# Patient Record
Sex: MALE | Race: WHITE | NOT HISPANIC OR LATINO | Employment: FULL TIME | URBAN - METROPOLITAN AREA
[De-identification: names, ages, dates, MRNs, and addresses within clinical notes are randomized per-mention and may not be internally consistent; named-entity substitution may affect disease eponyms.]

---

## 2023-06-21 ENCOUNTER — OFFICE VISIT (OUTPATIENT)
Dept: URGENT CARE | Facility: CLINIC | Age: 38
End: 2023-06-21
Payer: COMMERCIAL

## 2023-06-21 VITALS
SYSTOLIC BLOOD PRESSURE: 120 MMHG | HEIGHT: 73 IN | TEMPERATURE: 98 F | WEIGHT: 220 LBS | HEART RATE: 92 BPM | RESPIRATION RATE: 18 BRPM | DIASTOLIC BLOOD PRESSURE: 75 MMHG | OXYGEN SATURATION: 97 % | BODY MASS INDEX: 29.16 KG/M2

## 2023-06-21 DIAGNOSIS — J02.9 SORE THROAT: Primary | ICD-10-CM

## 2023-06-21 DIAGNOSIS — J02.9 PHARYNGITIS, UNSPECIFIED ETIOLOGY: ICD-10-CM

## 2023-06-21 LAB
CTP QC/QA: YES
MOLECULAR STREP A: NEGATIVE

## 2023-06-21 PROCEDURE — 99213 PR OFFICE/OUTPT VISIT, EST, LEVL III, 20-29 MIN: ICD-10-PCS | Mod: 25,,, | Performed by: NURSE PRACTITIONER

## 2023-06-21 PROCEDURE — 99213 OFFICE O/P EST LOW 20 MIN: CPT | Mod: 25,,, | Performed by: NURSE PRACTITIONER

## 2023-06-21 PROCEDURE — 96372 THER/PROPH/DIAG INJ SC/IM: CPT | Mod: ,,, | Performed by: NURSE PRACTITIONER

## 2023-06-21 PROCEDURE — 96372 PR INJECTION,THERAP/PROPH/DIAG2ST, IM OR SUBCUT: ICD-10-PCS | Mod: ,,, | Performed by: NURSE PRACTITIONER

## 2023-06-21 PROCEDURE — 87651 POCT STREP A MOLECULAR: ICD-10-PCS | Mod: QW,,, | Performed by: NURSE PRACTITIONER

## 2023-06-21 PROCEDURE — 87651 STREP A DNA AMP PROBE: CPT | Mod: QW,,, | Performed by: NURSE PRACTITIONER

## 2023-06-21 RX ORDER — DEXAMETHASONE SODIUM PHOSPHATE 100 MG/10ML
10 INJECTION INTRAMUSCULAR; INTRAVENOUS ONCE
Status: COMPLETED | OUTPATIENT
Start: 2023-06-21 | End: 2023-06-21

## 2023-06-21 RX ADMIN — DEXAMETHASONE SODIUM PHOSPHATE 10 MG: 100 INJECTION INTRAMUSCULAR; INTRAVENOUS at 09:06

## 2023-06-21 NOTE — PROGRESS NOTES
"Subjective:      Patient ID: Andrade Avalos is a 38 y.o. male.    Vitals:  height is 6' 1" (1.854 m) and weight is 99.8 kg (220 lb). His oral temperature is 98.2 °F (36.8 °C). His blood pressure is 120/75 and his pulse is 92. His respiration is 18 and oxygen saturation is 97%.     Chief Complaint: Sore Throat (Sore throat and left ear pain x one week. Requesting pcp referral.)    38-year-old male presents with sore throat and left ear discomfort.  Onset 2 days ago.  No shortness of breath or fever.    Sore Throat   Associated symptoms include ear pain (left).     HENT:  Positive for ear pain (left) and sore throat.     Objective:     Physical Exam   Constitutional: He is oriented to person, place, and time. He appears well-developed. He is cooperative.  Non-toxic appearance. He does not appear ill. No distress.   HENT:   Head: Normocephalic and atraumatic.   Ears:   Right Ear: Hearing, tympanic membrane, external ear and ear canal normal.   Left Ear: Hearing, tympanic membrane, external ear and ear canal normal.   Nose: Nose normal. No mucosal edema, rhinorrhea or nasal deformity. No epistaxis. Right sinus exhibits no maxillary sinus tenderness and no frontal sinus tenderness. Left sinus exhibits no maxillary sinus tenderness and no frontal sinus tenderness.   Mouth/Throat: Uvula is midline and mucous membranes are normal. No trismus in the jaw. Normal dentition. No uvula swelling. Oropharyngeal exudate and posterior oropharyngeal erythema present. No posterior oropharyngeal edema.   Eyes: Conjunctivae and lids are normal. No scleral icterus.   Neck: Trachea normal and phonation normal. Neck supple. No edema present. No erythema present. No neck rigidity present.   Cardiovascular: Normal rate, regular rhythm, normal heart sounds and normal pulses.   Pulmonary/Chest: Effort normal and breath sounds normal. No respiratory distress. He has no decreased breath sounds. He has no rhonchi.   Abdominal: Normal appearance. "   Musculoskeletal: Normal range of motion.         General: No deformity. Normal range of motion.   Neurological: He is alert and oriented to person, place, and time. He exhibits normal muscle tone. Coordination normal.   Skin: Skin is warm, dry, intact, not diaphoretic and not pale.   Psychiatric: His speech is normal and behavior is normal. Judgment and thought content normal.   Nursing note and vitals reviewed.    Assessment:     1. Sore throat    2. Pharyngitis, unspecified etiology      Office Visit on 06/21/2023   Component Date Value Ref Range Status    Molecular Strep A, POC 06/21/2023 Negative  Negative Final     Acceptable 06/21/2023 Yes   Final        Plan:   Increase oral fluids  Warm salt water gargles as instructed  OTC Chloraseptic spray as directed  Ibuprofen or Tylenol OTC for pain as directed  Change toothbrush  Follow up PCP or return here for concerns     Sore throat  -     POCT Strep A, Molecular    Pharyngitis, unspecified etiology  -     dexAMETHasone injection 10 mg

## 2023-06-21 NOTE — LETTER
June 21, 2023      [facility]  [address], [address]  [place] [address]  Phone: [phone]  Fax: [phone]       Patient: Andrade Avalos   YOB: 1985  Date of Visit: 06/21/2023    To Whom It May Concern:    Vick Avalos  was at [facility] on 06/21/2023. The patient may return to work/school on 06/22/2023 with no restrictions. If you have any questions or concerns, or if I can be of further assistance, please do not hesitate to contact me.    Sincerely,    Yulissa German MA

## 2023-06-21 NOTE — PATIENT INSTRUCTIONS
Increase oral fluids  Warm salt water gargles as instructed  OTC Chloraseptic spray as directed  Ibuprofen or Tylenol OTC for pain as directed  Change toothbrush  Follow up PCP or return here for concerns